# Patient Record
Sex: FEMALE | Race: BLACK OR AFRICAN AMERICAN | NOT HISPANIC OR LATINO | Employment: OTHER | URBAN - METROPOLITAN AREA
[De-identification: names, ages, dates, MRNs, and addresses within clinical notes are randomized per-mention and may not be internally consistent; named-entity substitution may affect disease eponyms.]

---

## 2017-03-28 DIAGNOSIS — I48.21 PERMANENT ATRIAL FIBRILLATION: Primary | ICD-10-CM

## 2017-05-01 ENCOUNTER — TELEPHONE (OUTPATIENT)
Dept: ELECTROPHYSIOLOGY | Facility: CLINIC | Age: 82
End: 2017-05-01

## 2017-05-02 ENCOUNTER — HOSPITAL ENCOUNTER (OUTPATIENT)
Dept: CARDIOLOGY | Facility: CLINIC | Age: 82
Discharge: HOME | End: 2017-05-02
Payer: MEDICARE

## 2017-05-02 ENCOUNTER — CLINICAL SUPPORT (OUTPATIENT)
Dept: ELECTROPHYSIOLOGY | Facility: CLINIC | Age: 82
End: 2017-05-02
Payer: MEDICARE

## 2017-05-02 ENCOUNTER — OFFICE VISIT (OUTPATIENT)
Dept: ELECTROPHYSIOLOGY | Facility: CLINIC | Age: 82
End: 2017-05-02
Payer: MEDICARE

## 2017-05-02 VITALS
HEART RATE: 86 BPM | WEIGHT: 130.94 LBS | HEIGHT: 60 IN | BODY MASS INDEX: 25.71 KG/M2 | DIASTOLIC BLOOD PRESSURE: 70 MMHG | SYSTOLIC BLOOD PRESSURE: 114 MMHG

## 2017-05-02 DIAGNOSIS — I48.21 ATRIAL FIBRILLATION, PERMANENT: Primary | ICD-10-CM

## 2017-05-02 DIAGNOSIS — Z95.0 PACEMAKER: ICD-10-CM

## 2017-05-02 DIAGNOSIS — Z95.0 CARDIAC PACEMAKER IN SITU: ICD-10-CM

## 2017-05-02 DIAGNOSIS — I48.91 ATRIAL FIBRILLATION: ICD-10-CM

## 2017-05-02 DIAGNOSIS — I48.21 PERMANENT ATRIAL FIBRILLATION: ICD-10-CM

## 2017-05-02 PROCEDURE — 99214 OFFICE O/P EST MOD 30 MIN: CPT | Mod: S$PBB,,, | Performed by: INTERNAL MEDICINE

## 2017-05-02 PROCEDURE — 93005 ELECTROCARDIOGRAM TRACING: CPT | Mod: PBBFAC | Performed by: INTERNAL MEDICINE

## 2017-05-02 PROCEDURE — 99999 PR PBB SHADOW E&M-EST. PATIENT-LVL III: CPT | Mod: PBBFAC,,, | Performed by: INTERNAL MEDICINE

## 2017-05-02 PROCEDURE — 93010 ELECTROCARDIOGRAM REPORT: CPT | Mod: S$PBB,,, | Performed by: INTERNAL MEDICINE

## 2017-05-02 PROCEDURE — 99213 OFFICE O/P EST LOW 20 MIN: CPT | Mod: PBBFAC,25 | Performed by: INTERNAL MEDICINE

## 2017-05-02 PROCEDURE — 93279 PRGRMG DEV EVAL PM/LDLS PM: CPT | Mod: PBBFAC | Performed by: INTERNAL MEDICINE

## 2017-05-02 NOTE — PROGRESS NOTES
Subjective:    Patient ID:  Jenae Bland is a 89 y.o. female who presents for follow-up of Atrial Fibrillation      HPI Comments: 89 yoF permanent AF, s/p SC PM implant 2007, generator change 2013 here for follow up.     Prior visits:  She was a [place] refugee living in [facility] until recently. She underwent SC PM 2007 with generator change 2013. She has moved back to [place]. She has no PM related complaints. She does not experience symptoms related to AF. She was taken off of aspirin for an unknown reason. She has never been on anticoagulants. She is here with her daughter. She asks for a home monitoring system for her PM. The patient denies interval chest pain, sob, palpitations, syncope, near syncope.     Interval history: Some increase in fatigue and dyspnea. Some falls, not on OAC. Normal SC PM function with 99% .     Past Medical History:  No date: Atrial fibrillation    Past Surgical History:  No date: INSERT / REPLACE / REMOVE PACEMAKER      Comment: 2007, SC PM MDT  9/5/2013: INSERT / REPLACE / REMOVE PACEMAKER      Comment: Generator change    Social History    Marital status:              Spouse name:                       Years of education:                 Number of children:               Occupational History    None on file    Social History Main Topics    Smoking status: Former Smoker                                                                Packs/day: 0.00      Years: 0.00           Quit date: 3/11/1984    Smokeless status: Never Used                        Alcohol use: Not on file     Drug use: Not on file     Sexual activity: Not on file          Other Topics            Concern    None on file    Social History Narrative    None on file    History reviewed.  No pertinent family history.        Atrial Fibrillation   Symptoms are negative for chest pain, palpitations and syncope. Past medical history includes atrial fibrillation.       Review of Systems   Constitution:  Positive for malaise/fatigue.   HENT: Negative.    Eyes: Negative.    Cardiovascular: Positive for dyspnea on exertion. Negative for chest pain, irregular heartbeat, near-syncope, palpitations and syncope.   Endocrine: Negative.    Hematologic/Lymphatic: Negative.    Skin: Negative.    Musculoskeletal: Negative.    Gastrointestinal: Negative.    Genitourinary: Negative.    Neurological: Negative.    Psychiatric/Behavioral: Negative.    Allergic/Immunologic: Negative.         Objective:    Physical Exam   Constitutional: She is oriented to person, place, and time. She appears well-developed and well-nourished. No distress.   HENT:   Head: Normocephalic and atraumatic.   Mouth/Throat: No oropharyngeal exudate.   Eyes: Conjunctivae and EOM are normal. Pupils are equal, round, and reactive to light. Right eye exhibits no discharge. Left eye exhibits no discharge.   Neck: Normal range of motion. Neck supple. No JVD present. No thyromegaly present.   Cardiovascular: Normal rate, regular rhythm and normal heart sounds.    No murmur heard.  Pulmonary/Chest: Effort normal and breath sounds normal. No respiratory distress. She has no wheezes.   L upper chest wound with underlying generator   Abdominal: Soft. Bowel sounds are normal. She exhibits no distension. There is no tenderness.   Musculoskeletal: Normal range of motion. She exhibits no edema.   Neurological: She is alert and oriented to person, place, and time. No cranial nerve deficit.   Skin: Skin is warm and dry. No rash noted. She is not diaphoretic. No erythema.   Psychiatric: She has a normal mood and affect. Her behavior is normal. Judgment and thought content normal.   Vitals reviewed.        Assessment:       1. Atrial fibrillation, permanent    2. Pacemaker         Plan:       In summary, Ms. Bland is a 89 y.o. female with permanent AF and SC PPM. Ms. Bland is doing well from a rhythm perspective with stable lead and device function without sustained  ventricular arrhythmia noted.     Continue current medication regimen and device settings.   Follow up in device clinic as scheduled.   Follow up in EP clinic in 1 year, sooner as needed.

## 2017-05-02 NOTE — MR AVS SNAPSHOT
[facility]  [address]  [facility] [address]  Phone: [phone]  Fax: [phone]                  Jenae Bland   2017 11:40 AM   Office Visit    Description:  Female : 1926   Provider:  Rubén Maurice MD   Department:  [facility]           Reason for Visit     Atrial Fibrillation           Diagnoses this Visit        Comments    Atrial fibrillation, permanent    -  Primary     Pacemaker                To Do List           Future Appointments        Provider Department Dept Phone    2017 8:00 AM HOME MONITOR DEVICE CHECK, [facility] [phone]      Goals (5 Years of Data)     None      Follow-Up and Disposition     Return in about 1 year (around 2018).      [facility]     [facility] Nurse Care Line -  Assistance  Unless otherwise directed by your provider, please contact [facility], our nurse care line that is available for  assistance.     Registered nurses in the [facility] Center provide: appointment scheduling, clinical advisement, health education, and other advisory services.  Call: [phone] (toll free)               Medications                Verify that the below list of medications is an accurate representation of the medications you are currently taking.  If none reported, the list may be blank. If incorrect, please contact your healthcare provider. Carry this list with you in case of emergency.           Current Medications     timolol maleate 0.5% (TIMOPTIC) 0.5 % Drop Place 1 drop into both eyes 2 (two) times daily.    gabapentin (NEURONTIN) 100 MG capsule Take 100 mg by mouth 3 (three) times daily.    ibuprofen (ADVIL,MOTRIN) 800 MG tablet Take 1 tablet by mouth every 12 (twelve) hours.    levothyroxine (SYNTHROID) 50 MCG tablet Take 1 tablet by mouth once daily.    pantoprazole (PROTONIX) 40 MG tablet Take 1 tablet by mouth once daily.    sertraline (ZOLOFT) 25 MG tablet Take 1  tablet by mouth once daily.    X-VIATE 40 % Crea            Clinical Reference Information           Your Vitals Were     BP Pulse Height Weight BMI    114/70 86 5' (1.524 m) 59.4 kg (130 lb 15.3 oz) 25.58 kg/m2      Blood Pressure          Most Recent Value    BP  114/70      Allergies as of 5/2/2017     No Known Allergies      Immunizations Administered on Date of Encounter - 5/2/2017     None      [facility] Sign-Up     Activating your [facility] account is as easy as 1-2-3!     1) Visit [email or web address], select Sign Up Now, enter this activation code and your date of birth, then select Next.  [email or web address]  Expires: 6/16/2017 11:59 AM      2) Create a username and password to use when you visit [facility] in the future and select a security question in case you lose your password and select Next.    3) Enter your e-mail address and click Sign Up!    Additional Information  If you have questions, please e-mail [email or web address] or call [phone] to talk to our [facility] staff. Remember, [facility] is NOT to be used for urgent needs. For medical emergencies, dial 911.         Language Assistance Services     ATTENTION: Language assistance services are available, free of charge. Please call [phone].      ATENCIÓN: Si habla español, tiene a calle disposición servicios gratuitos de asistencia lingüística. Llame al [phone].     CHÚ Ý: N?u b?n nói Ti?ng Vi?t, có các d?ch v? h? tr? ngôn ng? mi?n phí dành cho b?n. G?i s? [phone].         [facility] complies with applicable Federal civil rights laws and does not discriminate on the basis of race, color, national origin, age, disability, or sex.

## 2017-08-08 ENCOUNTER — TELEPHONE (OUTPATIENT)
Dept: ELECTROPHYSIOLOGY | Facility: CLINIC | Age: 82
End: 2017-08-08

## 2017-08-08 NOTE — TELEPHONE ENCOUNTER
I spoke with Cristin Campbell at the [facility] nursing [place] and asked that she send a remote pacemaker transmission for the patient as she was due yesterday. She informed me that she could not find the patients monitor. She asked if she could have the director of nursing call me back today. I am waiting for a call back.

## 2017-08-11 ENCOUNTER — TELEPHONE (OUTPATIENT)
Dept: ELECTROPHYSIOLOGY | Facility: CLINIC | Age: 82
End: 2017-08-11

## 2017-08-11 NOTE — TELEPHONE ENCOUNTER
Ms. Small patients daughter contacted the clinic in relation to her mothers sending a transmission from her remote monitor from the nursing home. Patients daughter was informed that patient transmission was not received. Daughter of patient will contact nursing [place] director regarding missed transmissions.

## 2017-09-25 ENCOUNTER — TELEPHONE (OUTPATIENT)
Dept: ELECTROPHYSIOLOGY | Facility: CLINIC | Age: 82
End: 2017-09-25

## 2017-09-25 NOTE — TELEPHONE ENCOUNTER
Spoke to the nursing home in regards to the patients home monitor and the nurse said she will have to call me back when she gets to her room.

## 2017-10-05 NOTE — TELEPHONE ENCOUNTER
Spoke to nursing home in regards to home monitor. I spoke to the patients nurse and gave her instructions on how to submit a manual transmission. I gave her the number here to call back.

## 2018-06-25 ENCOUNTER — TELEPHONE (OUTPATIENT)
Dept: ELECTROPHYSIOLOGY | Facility: CLINIC | Age: 83
End: 2018-06-25

## 2018-06-25 NOTE — TELEPHONE ENCOUNTER
----- Message from Chrissy Matute MA sent at 6/25/2018  8:32 AM CDT -----  Contact:  daughter called  Please call the patient daughter Staci at [phone] or at [phone]  she need to talk to someone about her device it's a little black Abiel box. Thank you.

## 2018-06-25 NOTE — TELEPHONE ENCOUNTER
Returned the call to patient's daughter Staci's call on this morning.  Patient's daughter was calling to find out if a piece of equipment she has at her home that was for the patient, belonged to the clinic.  Informed patient's daughter the Remote pacemaker monitor is for [employer].  Patient's daughter stated someone has already arranged for a return kit for the remote monitor.  Informed patient's daughter that the remote monitor would consist of the Care link monitor and a black Wire X device that connected to the Care link monitor.  Staci stated the box she has says [employer] on it. Patient's daughter stated she will box up whatever was connected to the remote monitor and send it back to [employer].